# Patient Record
Sex: MALE | Race: WHITE | Employment: FULL TIME | URBAN - METROPOLITAN AREA
[De-identification: names, ages, dates, MRNs, and addresses within clinical notes are randomized per-mention and may not be internally consistent; named-entity substitution may affect disease eponyms.]

---

## 2019-12-02 ENCOUNTER — OFFICE VISIT (OUTPATIENT)
Dept: INTERNAL MEDICINE | Facility: CLINIC | Age: 44
End: 2019-12-02
Payer: COMMERCIAL

## 2019-12-02 VITALS
HEIGHT: 71 IN | TEMPERATURE: 98.2 F | WEIGHT: 315 LBS | DIASTOLIC BLOOD PRESSURE: 102 MMHG | RESPIRATION RATE: 20 BRPM | HEART RATE: 86 BPM | BODY MASS INDEX: 44.1 KG/M2 | OXYGEN SATURATION: 100 % | SYSTOLIC BLOOD PRESSURE: 150 MMHG

## 2019-12-02 DIAGNOSIS — I10 ESSENTIAL HYPERTENSION: Primary | ICD-10-CM

## 2019-12-02 DIAGNOSIS — E66.01 MORBID OBESITY (H): ICD-10-CM

## 2019-12-02 DIAGNOSIS — F17.200 TOBACCO USE DISORDER: ICD-10-CM

## 2019-12-02 PROCEDURE — 80048 BASIC METABOLIC PNL TOTAL CA: CPT | Performed by: INTERNAL MEDICINE

## 2019-12-02 PROCEDURE — 99204 OFFICE O/P NEW MOD 45 MIN: CPT | Performed by: INTERNAL MEDICINE

## 2019-12-02 PROCEDURE — 36415 COLL VENOUS BLD VENIPUNCTURE: CPT | Performed by: INTERNAL MEDICINE

## 2019-12-02 RX ORDER — AMLODIPINE BESYLATE 5 MG/1
5 TABLET ORAL DAILY
Qty: 30 TABLET | Refills: 0 | Status: SHIPPED | OUTPATIENT
Start: 2019-12-02 | End: 2019-12-09 | Stop reason: DRUGHIGH

## 2019-12-02 ASSESSMENT — MIFFLIN-ST. JEOR: SCORE: 2364.31

## 2019-12-02 NOTE — NURSING NOTE
"BP (!) 150/102   Pulse 86   Temp 98.2  F (36.8  C) (Oral)   Resp 20   Wt 146.5 kg (323 lb)   SpO2 100%   Patient in for consultation on elevated BP.  Ana Paula Jha CMA    Conflicting patient information:     When verifying patients allergies, medication, diagnosis and encounters, patient stated they were not accurate. I sent him to the  so they could see if this was the patients correct chart. They stated his current phone number and past addresses helped verify this was the right chart. Patient did state he had not been seen \"by any doctors for 20 years!\". I looked into [facility] to verify immunizations, past address and mothers maiden name, patient verified that all the information was NOT accurate. Kaylah Mccollum was notified and an incident report was initiated.  Ana Paula Jha CMA    "

## 2019-12-02 NOTE — PROGRESS NOTES
"Subjective     Noam Perez is a 44 year old male who presents to clinic today for the following health issues:    HPI      Pt is a 44 year old male who is seen here to day to follow up on elevated BP . Pt was at his dentist office on 11/12/19 for teeth extraction and his BP was high- 155/113, 159/108 and 161/111.   No h/o HTN in the past, has never seen any provider in the last 20 yrs per pt..  Has family history of hypertension in his father and her brother.. Patient does smoke 1 pack a day since age 21.        Patient Active Problem List   Diagnosis     Morbid obesity (H)     History reviewed. No pertinent surgical history.    Social History     Tobacco Use     Smoking status: Current Every Day Smoker     Packs/day: 1.00     Smokeless tobacco: Never Used     Tobacco comment: startred a tthe age of 21   Substance Use Topics     Alcohol use: Yes     Comment: sociallu     Family History   Problem Relation Age of Onset     Heart Disease Father      Hypertension Father      Hypertension Brother          Current Outpatient Medications   Medication Sig Dispense Refill     amLODIPine (NORVASC) 5 MG tablet Take 1 tablet (5 mg) by mouth daily 30 tablet 0         Reviewed and updated as needed this visit by Provider         Review of Systems   ROS COMP: CONSTITUTIONAL: NEGATIVE for fever, chills, change in weight  EYES: NEGATIVE for vision changes or irritation  ENT/MOUTH: NEGATIVE for ear, mouth and throat problems  RESP: NEGATIVE for significant cough or SOB  CV: NEGATIVE for chest pain, palpitations or peripheral edema  MUSCULOSKELETAL: NEGATIVE for significant arthralgias or myalgia  NEURO: NEGATIVE for weakness, dizziness or paresthesias  Psych;negative  Heme;negative  Endo;negative          Objective    BP (!) 150/102   Pulse 86   Temp 98.2  F (36.8  C) (Oral)   Resp 20   Ht 1.791 m (5' 10.5\")   Wt 146.5 kg (323 lb)   SpO2 100%   BMI 45.69 kg/m    There is no height or weight on file to calculate " "BMI.  Physical Exam   GENERAL: healthy, alert and no distress  EYES: Eyes grossly normal to inspection, PERRL and conjunctivae and sclerae normal  NECK: no adenopathy, no asymmetry, masses, or scars and thyroid normal to palpation  RESP: lungs clear to auscultation - no rales, rhonchi or wheezes  CV: regular rate and rhythm,    MS: no gross musculoskeletal defects noted, no edema, no calf tenderness  NEURO: Normal strength and tone, mentation intact and speech normal           Assessment & Plan     (I10) Essential hypertension  (primary encounter diagnosis)  Comment: elevated BP   Plan: Discussed sodium restriction, maintaining ideal body weight and regular exercise program as physiologic means to achieve blood pressure control. The patient will strive towards this. Meanwhile, it is appropriate to lower BP with medications, while observing for therapeutic effect and if appropriate later, can discontinue medications if physiologic methods appear to be effective. The patient indicates understanding of these issues and agrees with the plan.   Started on amLODIPine (NORVASC) 5 MG tablet daily as directed.explained clearly about the medication,insructions and side effects. Check Basic metabolic panel. Pt was advised to f/u in 1 week.             (E66.01) Morbid obesity (H)  Plan:  Discussed in detail about Diet,calorie intake,and importance of regular exercise.      (F17.200) Tobacco use disorder  Plan: discussed smoking cessation , medication options, pt not interested at this time .       Tobacco Cessation:   reports that he has been smoking. He has been smoking about 1.00 pack per day. He has never used smokeless tobacco.  Tobacco Cessation Action Plan: Information offered: Patient not interested at this time      BMI:   Estimated body mass index is 45.69 kg/m  as calculated from the following:    Height as of this encounter: 1.791 m (5' 10.5\").    Weight as of this encounter: 146.5 kg (323 lb).   Weight management " plan: Discussed healthy diet and exercise guidelines      FUTURE APPOINTMENTS:       - Follow-up visit in 1 week    Pt says he has been never seen by any provider for 20 yrs, but  pts chart review has several encounters with providers and pts says that is not his information,. Clinic administrator Kaylah Mccollum  was contacted by MA and report sent to [facility] to correct this.     Silvia Rendon MD  [facility]

## 2019-12-03 LAB
ANION GAP SERPL CALCULATED.3IONS-SCNC: 8 MMOL/L (ref 3–14)
BUN SERPL-MCNC: 13 MG/DL (ref 7–30)
CALCIUM SERPL-MCNC: 8.6 MG/DL (ref 8.5–10.1)
CHLORIDE SERPL-SCNC: 105 MMOL/L (ref 94–109)
CO2 SERPL-SCNC: 24 MMOL/L (ref 20–32)
CREAT SERPL-MCNC: 1.12 MG/DL (ref 0.66–1.25)
GFR SERPL CREATININE-BSD FRML MDRD: 78 ML/MIN/{1.73_M2}
GLUCOSE SERPL-MCNC: 84 MG/DL (ref 70–99)
POTASSIUM SERPL-SCNC: 3.7 MMOL/L (ref 3.4–5.3)
SODIUM SERPL-SCNC: 137 MMOL/L (ref 133–144)

## 2019-12-09 ENCOUNTER — OFFICE VISIT (OUTPATIENT)
Dept: INTERNAL MEDICINE | Facility: CLINIC | Age: 44
End: 2019-12-09
Payer: COMMERCIAL

## 2019-12-09 VITALS
BODY MASS INDEX: 44.1 KG/M2 | TEMPERATURE: 97.9 F | WEIGHT: 315 LBS | HEART RATE: 89 BPM | RESPIRATION RATE: 22 BRPM | SYSTOLIC BLOOD PRESSURE: 142 MMHG | DIASTOLIC BLOOD PRESSURE: 100 MMHG | HEIGHT: 71 IN | OXYGEN SATURATION: 95 %

## 2019-12-09 DIAGNOSIS — I10 ESSENTIAL HYPERTENSION: Primary | ICD-10-CM

## 2019-12-09 DIAGNOSIS — F17.200 TOBACCO USE DISORDER: ICD-10-CM

## 2019-12-09 PROCEDURE — 99214 OFFICE O/P EST MOD 30 MIN: CPT | Performed by: INTERNAL MEDICINE

## 2019-12-09 RX ORDER — AMLODIPINE BESYLATE 10 MG/1
10 TABLET ORAL DAILY
Qty: 30 TABLET | Refills: 0 | Status: SHIPPED | OUTPATIENT
Start: 2019-12-09 | End: 2020-01-03

## 2019-12-09 ASSESSMENT — MIFFLIN-ST. JEOR: SCORE: 2353.42

## 2019-12-09 NOTE — PROGRESS NOTES
"Subjective     Michael Duane Anderson is a 44 year old male who presents to clinic today for the following health issues:    HPI     Hypertension Follow-up      Do you check your blood pressure regularly outside of the clinic? No . started on Norvasc 5 mg at last OV, tolerating well.     Are you following a low salt diet? Yes    Are your blood pressures ever more than 140 on the top number (systolic) OR more   than 90 on the bottom number (diastolic), for example 140/90? Yes    Tobacco use disorder; smoking 1 pack a day since age 21  and not interested in quitting smoking at this time.      Patient Active Problem List   Diagnosis     Morbid obesity (H)     Essential hypertension     Tobacco use disorder     History reviewed. No pertinent surgical history.    Social History     Tobacco Use     Smoking status: Current Every Day Smoker     Packs/day: 1.00     Smokeless tobacco: Never Used     Tobacco comment: startred a tthe age of 21   Substance Use Topics     Alcohol use: Yes     Comment: sociallu     Family History   Problem Relation Age of Onset     Heart Disease Father      Hypertension Father      Hypertension Brother          Current Outpatient Medications   Medication Sig Dispense Refill     amLODIPine (NORVASC) 5 MG tablet Take 1 tablet (5 mg) by mouth daily 30 tablet 0       Reviewed and updated as needed this visit by Provider         Review of Systems   ROS COMP: CONSTITUTIONAL: NEGATIVE for fever, chills, change in weight  RESP: NEGATIVE for significant cough or SOB  CV: NEGATIVE for chest pain, palpitations or peripheral edema  MUSCULOSKELETAL: NEGATIVE for significant arthralgias or myalgia  NEURO: NEGATIVE for weakness, dizziness or paresthesias      Objective    BP (!) 142/100   Pulse 89   Temp 97.9  F (36.6  C) (Oral)   Resp 22   Ht 1.791 m (5' 10.5\")   Wt 145.4 kg (320 lb 9.6 oz)   SpO2 95%   BMI 45.35 kg/m    Body mass index is 45.35 kg/m .  Physical Exam   GENERAL: healthy, alert and no " "distress  RESP: lungs clear to auscultation - no rales, rhonchi or wheezes  CV: regular rate and rhythm,    MS: no gross musculoskeletal defects noted, no edema  NEURO: Normal strength and tone, mentation intact and speech normal        Assessment & Plan     (I10) Essential hypertension  (primary encounter diagnosis)  Plan: Blood pressure still high, increase amlodipine to 10 mg 1 tablet once daily, continue to follow low-sodium diet regular exercise and follow-up in 3 wks       (F17.200) Tobacco use disorder  Plan: Discussed smoking cessation, patient is not interested at this time     Tobacco Cessation:   reports that he has been smoking. He has been smoking about 1.00 pack per day. He has never used smokeless tobacco.  Tobacco Cessation Action Plan: Information offered: Patient not interested at this time      BMI:   Estimated body mass index is 45.35 kg/m  as calculated from the following:    Height as of this encounter: 1.791 m (5' 10.5\").    Weight as of this encounter: 145.4 kg (320 lb 9.6 oz).   Weight management plan: Discussed healthy diet and exercise guidelines        FUTURE APPOINTMENTS:       - Follow-up visit in 3 weeks        Silvia Rendon MD  [facility]        "

## 2019-12-09 NOTE — NURSING NOTE
"BP (!) 140/98   Pulse 89   Temp 97.9  F (36.6  C) (Oral)   Resp 22   Ht 1.791 m (5' 10.5\")   Wt 145.4 kg (320 lb 9.6 oz)   SpO2 95%   BMI 45.35 kg/m    Patient in for med recheck.  Ana Paula Jha, DIANA    "

## 2020-01-03 ENCOUNTER — OFFICE VISIT (OUTPATIENT)
Dept: INTERNAL MEDICINE | Facility: CLINIC | Age: 45
End: 2020-01-03
Payer: COMMERCIAL

## 2020-01-03 VITALS
SYSTOLIC BLOOD PRESSURE: 138 MMHG | DIASTOLIC BLOOD PRESSURE: 88 MMHG | OXYGEN SATURATION: 96 % | WEIGHT: 315 LBS | RESPIRATION RATE: 19 BRPM | HEIGHT: 71 IN | TEMPERATURE: 98.3 F | BODY MASS INDEX: 44.1 KG/M2 | HEART RATE: 94 BPM

## 2020-01-03 DIAGNOSIS — I10 ESSENTIAL HYPERTENSION: ICD-10-CM

## 2020-01-03 PROCEDURE — 99213 OFFICE O/P EST LOW 20 MIN: CPT | Performed by: INTERNAL MEDICINE

## 2020-01-03 RX ORDER — AMLODIPINE BESYLATE 10 MG/1
10 TABLET ORAL DAILY
Qty: 90 TABLET | Refills: 1 | Status: SHIPPED | OUTPATIENT
Start: 2020-01-03 | End: 2020-09-30

## 2020-01-03 ASSESSMENT — MIFFLIN-ST. JEOR: SCORE: 2372.02

## 2020-01-03 NOTE — NURSING NOTE
"BP (!) 138/96   Pulse 94   Temp 98.3  F (36.8  C) (Oral)   Resp 19   Ht 1.791 m (5' 10.5\")   Wt 147.3 kg (324 lb 11.2 oz)   SpO2 96%   BMI 45.93 kg/m    Patient in for BP med recheck.  Ana Paula Jha, DIANA    "

## 2020-01-03 NOTE — PROGRESS NOTES
"Subjective     Michael Duane Anderson is a 44 year old male who presents to clinic today for the following health issues:    HPI   Hypertension Follow-up      Do you check your blood pressure regularly outside of the clinic? No     Are you following a low salt diet? Yes    Are your blood pressures ever more than 140 on the top number (systolic) OR more   than 90 on the bottom number (diastolic), for example 140/90? Yes       Patient Active Problem List   Diagnosis     Morbid obesity (H)     Essential hypertension     Tobacco use disorder     History reviewed. No pertinent surgical history.    Social History     Tobacco Use     Smoking status: Current Every Day Smoker     Packs/day: 1.00     Smokeless tobacco: Never Used     Tobacco comment: startred a tthe age of 21   Substance Use Topics     Alcohol use: Yes     Comment: sociallu     Family History   Problem Relation Age of Onset     Heart Disease Father      Hypertension Father      Hypertension Brother          Current Outpatient Medications   Medication Sig Dispense Refill     amLODIPine (NORVASC) 10 MG tablet Take 1 tablet (10 mg) by mouth daily 90 tablet 1         Reviewed and updated as needed this visit by Provider         Review of Systems   ROS COMP: CONSTITUTIONAL: NEGATIVE for fever, chills, change in weight  RESP: NEGATIVE for significant cough or SOB  CV: NEGATIVE for chest pain, palpitations or peripheral edema  MUSCULOSKELETAL: NEGATIVE for significant arthralgias or myalgia  NEURO: NEGATIVE for weakness, dizziness or paresthesias        Objective    /88   Pulse 94   Temp 98.3  F (36.8  C) (Oral)   Resp 19   Ht 1.791 m (5' 10.5\")   Wt 147.3 kg (324 lb 11.2 oz)   SpO2 96%   BMI 45.93 kg/m    Body mass index is 45.93 kg/m .  Physical Exam   GENERAL: healthy, alert and no distress  EYES: Eyes grossly normal to inspection, PERRL and conjunctivae and sclerae normal  NECK: no adenopathy, no asymmetry, masses, or scars and thyroid normal to " palpation  RESP: lungs clear to auscultation - no rales, rhonchi or wheezes  CV: regular rate and rhythm,  MS: no gross musculoskeletal defects noted, no edema  NEURO: Normal strength and tone, mentation intact and speech normal           Assessment & Plan     (I10) Essential hypertension  Comment: BP has improved,   Plan: refilled  amLODIPine (NORVASC) 10 MG tablet as directed.explained clearly about the medication,insructions and side effects. Note given for pts dentist about pts BP. .Advised to follow low salt diet and exercise and f/u in 6 mths.  Again counseled on smoking cessation              FUTURE APPOINTMENTS:       - Follow-up visit in 3 months         Silvia Rendon MD  [facility]

## 2020-09-30 DIAGNOSIS — I10 ESSENTIAL HYPERTENSION: ICD-10-CM

## 2020-09-30 RX ORDER — AMLODIPINE BESYLATE 10 MG/1
TABLET ORAL
Qty: 90 TABLET | Refills: 0 | Status: SHIPPED | OUTPATIENT
Start: 2020-09-30 | End: 2021-01-29

## 2020-09-30 NOTE — LETTER
[facility]  [address], [address]  [place], [place]  [address]                                            TEL:[phone]  FAX:[phone]      Michael Duane Anderson  [address]  [place] [address]      September 30, 2020    Dear Noam      We have received a refill request from your pharmacy for your medication - Amlodipine. Many medications require routine follow-up with your provider and a review of your chart indicates that you are due for blood pressure appointment and labs before next refill. We have sent a one time, 90 day refill to your pharmacy to allow you time to schedule an appointment.     Please call [phone] to schedule an appointment.  We look forward to seeing you in the near future.      Thank you,     [facility]

## 2020-09-30 NOTE — TELEPHONE ENCOUNTER
Medication is being filled for 1 time refill only due to:  Patient needs to be seen because due for BP and labs.     Letter mailed to patient to schedule an appointment.

## 2021-01-29 DIAGNOSIS — I10 ESSENTIAL HYPERTENSION: ICD-10-CM

## 2021-01-29 RX ORDER — AMLODIPINE BESYLATE 10 MG/1
TABLET ORAL
Qty: 30 TABLET | Refills: 0 | Status: SHIPPED | OUTPATIENT
Start: 2021-01-29 | End: 2021-03-09

## 2021-01-29 NOTE — LETTER
[facility]  [address], [address]  [place], [place]  [address]                                            TEL:[phone]  FAX:[phone]      Michael Duane Anderson  [address]  [place] [address]      February 2, 2021    Dear Noam,    We are concerned about your health care.  We recently provided you with a medication refill.  Many medications require routine follow-up with your provider.      At this time we ask that: You schedule a routine office visit with your physician to follow your medications and labs.    Your prescription: Has been refilled for 1 month so you may have time for the above noted follow-up.      Thank you,      Jeyson Rendon M.D.

## 2021-01-29 NOTE — TELEPHONE ENCOUNTER
"Requested Prescriptions   Pending Prescriptions Disp Refills     amLODIPine (NORVASC) 10 MG tablet [Pharmacy Med Name: amLODIPine Besylate 10 MG Oral Tablet] 90 tablet 0     Sig: Take 1 tablet by mouth once daily       Calcium Channel Blockers Protocol  Failed - 1/29/2021  3:43 PM        Failed - Blood pressure under 140/90 in past 12 months     BP Readings from Last 3 Encounters:   01/03/20 138/88   12/09/19 (!) 142/100   12/02/19 (!) 150/102                 Failed - Recent (12 mo) or future (30 days) visit within the authorizing provider's specialty     Patient has had an office visit with the authorizing provider or a provider within the authorizing providers department within the previous 12 mos or has a future within next 30 days. See \"Patient Info\" tab in inbasket, or \"Choose Columns\" in Meds & Orders section of the refill encounter.              Failed - Normal serum creatinine on file in past 12 months     Recent Labs   Lab Test 12/02/19  1657   CR 1.12       Ok to refill medication if creatinine is low          Passed - Medication is active on med list        Passed - Patient is age 18 or older             "

## 2021-01-29 NOTE — TELEPHONE ENCOUNTER
Routing refill request to provider for review/approval because:  Mylene given x1 and patient did not follow up, please advise

## 2021-03-09 ENCOUNTER — OFFICE VISIT (OUTPATIENT)
Dept: INTERNAL MEDICINE | Facility: CLINIC | Age: 46
End: 2021-03-09
Payer: COMMERCIAL

## 2021-03-09 VITALS
WEIGHT: 315 LBS | HEIGHT: 71 IN | SYSTOLIC BLOOD PRESSURE: 148 MMHG | BODY MASS INDEX: 44.1 KG/M2 | DIASTOLIC BLOOD PRESSURE: 98 MMHG | RESPIRATION RATE: 17 BRPM | OXYGEN SATURATION: 99 % | HEART RATE: 83 BPM | TEMPERATURE: 98.1 F

## 2021-03-09 DIAGNOSIS — F17.200 TOBACCO USE DISORDER: ICD-10-CM

## 2021-03-09 DIAGNOSIS — I10 ESSENTIAL HYPERTENSION: Primary | ICD-10-CM

## 2021-03-09 DIAGNOSIS — E66.01 MORBID OBESITY (H): ICD-10-CM

## 2021-03-09 PROCEDURE — 36415 COLL VENOUS BLD VENIPUNCTURE: CPT | Performed by: INTERNAL MEDICINE

## 2021-03-09 PROCEDURE — 80048 BASIC METABOLIC PNL TOTAL CA: CPT | Performed by: INTERNAL MEDICINE

## 2021-03-09 PROCEDURE — 99214 OFFICE O/P EST MOD 30 MIN: CPT | Performed by: INTERNAL MEDICINE

## 2021-03-09 RX ORDER — LISINOPRIL 10 MG/1
10 TABLET ORAL DAILY
Qty: 31 TABLET | Refills: 0 | Status: SHIPPED | OUTPATIENT
Start: 2021-03-09 | End: 2021-06-28

## 2021-03-09 RX ORDER — AMLODIPINE BESYLATE 10 MG/1
10 TABLET ORAL DAILY
Qty: 31 TABLET | Refills: 0 | Status: SHIPPED | OUTPATIENT
Start: 2021-03-09 | End: 2021-05-26

## 2021-03-09 ASSESSMENT — MIFFLIN-ST. JEOR: SCORE: 2370.64

## 2021-03-09 NOTE — NURSING NOTE
"BP (!) 148/98   Pulse 83   Temp 98.1  F (36.7  C) (Oral)   Resp 17   Ht 1.791 m (5' 10.5\")   Wt 148.1 kg (326 lb 9.6 oz)   SpO2 99%   BMI 46.20 kg/m    Patient in for follow up HTN.  Ana Paula Jha, DIANA    "

## 2021-03-09 NOTE — PROGRESS NOTES
"    Assessment & Plan     (I10) Essential hypertension  (primary encounter diagnosis)  Comment: BP elevated   Plan: started on   lisinopril (ZESTRIL) 10 MG tablet once daily as directed.explained clearly about the medication,insructions and side effects. Continue and refilled   , amLODIPine (NORVASC) 10 MG tablet.explained clearly about the medication,insructions and side effects.  Continue to follow low-sodium diet regular exercise and follow-up in clinic in 1 month, will check  basic metabolic panel,  Patient was counseled on smoking cessation.    (E66.01) Morbid obesity (H)  Plan: Discussed in detail about Diet,calorie intake,and importance of regular exercise,       (F17.200) Tobacco use disorder  Plan: smokes 1ppd for 24 yrs, advised/counscelled on smoking cessation     Review of the result(s) of each unique test - BMP     Tobacco Cessation:   reports that he has been smoking. He has a 24.00 pack-year smoking history. He has never used smokeless tobacco.  Tobacco Cessation Action Plan: Information offered: Patient not interested at this time    BMI:   Estimated body mass index is 46.2 kg/m  as calculated from the following:    Height as of this encounter: 1.791 m (5' 10.5\").    Weight as of this encounter: 148.1 kg (326 lb 9.6 oz).   Weight management plan: Discussed healthy diet and exercise guidelines      Return in about 4 weeks (around 4/6/2021) for BP Recheck.    Silvia Rendon MD  [facility] JUHI Santacruz is a 46 year old who presents for the following health issues    HPI       Hypertension Follow-up      Do you check your blood pressure regularly outside of the clinic? No , currently on amlodipine 10 mg daily tolerating well    Are you following a low salt diet? Yes    Are your blood pressures ever more than 140 on the top number (systolic) OR more   than 90 on the bottom number (diastolic), for example 140/90? Yes    History reviewed. No pertinent past " "medical history.     Patient Active Problem List   Diagnosis     Morbid obesity (H)     Essential hypertension     Tobacco use disorder         Current Outpatient Medications   Medication Sig Dispense Refill     amLODIPine (NORVASC) 10 MG tablet Take 1 tablet (10 mg) by mouth daily 31 tablet 0     lisinopril (ZESTRIL) 10 MG tablet Take 1 tablet (10 mg) by mouth daily 31 tablet 0         Review of Systems   CONSTITUTIONAL: NEGATIVE for fever, chills, change in weight  RESP: NEGATIVE for significant cough or SOB  CV: NEGATIVE for chest pain, palpitations or peripheral edema  MUSCULOSKELETAL: NEGATIVE for significant arthralgias or myalgia      Objective    BP (!) 148/98   Pulse 83   Temp 98.1  F (36.7  C) (Oral)   Resp 17   Ht 1.791 m (5' 10.5\")   Wt 148.1 kg (326 lb 9.6 oz)   SpO2 99%   BMI 46.20 kg/m    Body mass index is 46.2 kg/m .  Repeat blood pressure 150/100  Physical Exam   GENERAL: healthy, alert and no distress  RESP: lungs clear to auscultation - no rales, rhonchi or wheezes  CV: regular rate and rhythm,    MS: no gross musculoskeletal defects noted, no edema           "

## 2021-03-11 LAB
ANION GAP SERPL CALCULATED.3IONS-SCNC: 2 MMOL/L (ref 3–14)
BUN SERPL-MCNC: 12 MG/DL (ref 7–30)
CALCIUM SERPL-MCNC: 8.9 MG/DL (ref 8.5–10.1)
CHLORIDE SERPL-SCNC: 107 MMOL/L (ref 94–109)
CO2 SERPL-SCNC: 27 MMOL/L (ref 20–32)
CREAT SERPL-MCNC: 1.15 MG/DL (ref 0.66–1.25)
GFR SERPL CREATININE-BSD FRML MDRD: 75 ML/MIN/{1.73_M2}
GLUCOSE SERPL-MCNC: 80 MG/DL (ref 70–99)
POTASSIUM SERPL-SCNC: 4.6 MMOL/L (ref 3.4–5.3)
SODIUM SERPL-SCNC: 136 MMOL/L (ref 133–144)

## 2021-04-03 ENCOUNTER — HEALTH MAINTENANCE LETTER (OUTPATIENT)
Age: 46
End: 2021-04-03

## 2021-05-13 DIAGNOSIS — I10 ESSENTIAL HYPERTENSION: ICD-10-CM

## 2021-05-14 NOTE — TELEPHONE ENCOUNTER
Patient's blood pressure medication was adjusted at last office visit and he was advised to follow-up in 1 month no appointment made.    Please advise patient that I can see him today at 3:40 PM in clinic on my virtual spot

## 2021-05-14 NOTE — TELEPHONE ENCOUNTER
Pending Prescriptions:                       Disp   Refills    amLODIPine (NORVASC) 10 MG tablet [Pharmac*31 tab*0        Sig: Take 1 tablet by mouth once daily    Routing refill request to provider for review/approval because:  BP Readings from Last 3 Encounters:   03/09/21 (!) 148/98   01/03/20 138/88   12/09/19 (!) 142/100

## 2021-05-26 RX ORDER — AMLODIPINE BESYLATE 10 MG/1
TABLET ORAL
Qty: 15 TABLET | Refills: 0 | Status: SHIPPED | OUTPATIENT
Start: 2021-05-26 | End: 2021-05-27

## 2021-05-26 NOTE — TELEPHONE ENCOUNTER
Attempted to contact pt. Left message to call clinic.     Pt has not called back yet.   He was no show 1 x, and cancelled once in April.     Please deny or approve medication.

## 2021-05-27 RX ORDER — AMLODIPINE BESYLATE 10 MG/1
10 TABLET ORAL DAILY
Qty: 15 TABLET | Refills: 0 | Status: SHIPPED | OUTPATIENT
Start: 2021-05-27 | End: 2021-06-28

## 2021-05-27 NOTE — TELEPHONE ENCOUNTER
Left message on voice mail -that Patient   needs and appiontment and medication was refilled for two weeks only       SYEDA Whitehead LPN

## 2021-05-27 NOTE — TELEPHONE ENCOUNTER
Prescription did not transmit to pharmacy. Call to pharmacy and confirmed this. Prescription resent.

## 2021-05-27 NOTE — TELEPHONE ENCOUNTER
I have refilled for 2 weeks only, please advise appointment to see me in clinic, can schedule at any of my virtual spots

## 2021-06-28 ENCOUNTER — OFFICE VISIT (OUTPATIENT)
Dept: INTERNAL MEDICINE | Facility: CLINIC | Age: 46
End: 2021-06-28
Payer: COMMERCIAL

## 2021-06-28 VITALS
OXYGEN SATURATION: 98 % | BODY MASS INDEX: 44.1 KG/M2 | HEIGHT: 71 IN | HEART RATE: 89 BPM | WEIGHT: 315 LBS | TEMPERATURE: 98.2 F | SYSTOLIC BLOOD PRESSURE: 162 MMHG | DIASTOLIC BLOOD PRESSURE: 110 MMHG | RESPIRATION RATE: 18 BRPM

## 2021-06-28 DIAGNOSIS — I10 ESSENTIAL HYPERTENSION: ICD-10-CM

## 2021-06-28 PROCEDURE — 99214 OFFICE O/P EST MOD 30 MIN: CPT | Performed by: INTERNAL MEDICINE

## 2021-06-28 RX ORDER — LISINOPRIL 10 MG/1
10 TABLET ORAL DAILY
Qty: 31 TABLET | Refills: 1 | Status: SHIPPED | OUTPATIENT
Start: 2021-06-28

## 2021-06-28 RX ORDER — AMLODIPINE BESYLATE 10 MG/1
10 TABLET ORAL DAILY
Qty: 31 TABLET | Refills: 1 | Status: SHIPPED | OUTPATIENT
Start: 2021-06-28

## 2021-06-28 ASSESSMENT — MIFFLIN-ST. JEOR: SCORE: 2392.86

## 2021-06-28 NOTE — PROGRESS NOTES
Assessment & Plan     (I10) Essential hypertension  Plan: Elevated blood pressure , restarted on amLODIPine (NORVASC) 10 MG tablet,and  lisinopril (ZESTRIL) 10 MG tablet as directed.explained clearly about the medication,insructions and side effects. Discussed sodium restriction, maintaining ideal body weight and regular exercise program as physiologic means to achieve blood pressure control. The patient will strive towards this.   Continue home readings and return in 2 wks for nurse only BP appt.            Prescription drug management       Return in about 2 weeks (around 7/12/2021) for BP Recheck- nurse only appt .    Silvia Rendon MD  [facility] JUHI Santacruz is a 46 year old who presents for the following health issues   Patient is being seen for an blood pressure follow up.  HPI     Hypertension Follow-up      Do you check your blood pressure regularly outside of the clinic? No , patient was started on lisinopril 10 mg at last office visit, also lost on amlodipine 10 mg but patient has been out of his blood pressure medications for 1 week    Are you following a low salt diet? No    Are your blood pressures ever more than 140 on the top number (systolic) OR more   than 90 on the bottom number (diastolic), for example 140/90? Yes      How many servings of fruits and vegetables do you eat daily?  0-1    On average, how many sweetened beverages do you drink each day (Examples: soda, juice, sweet tea, etc.  Do NOT count diet or artificially sweetened beverages)?   1    How many days per week do you exercise enough to make your heart beat faster? 7    How many minutes a day do you exercise enough to make your heart beat faster? 20 - 29    How many days per week do you miss taking your medication? 0      Patient Active Problem List   Diagnosis     Morbid obesity (H)     Essential hypertension     Tobacco use disorder         Current Outpatient Medications  "  Medication Sig Dispense Refill     amLODIPine (NORVASC) 10 MG tablet Take 1 tablet (10 mg) by mouth daily 31 tablet 1     lisinopril (ZESTRIL) 10 MG tablet Take 1 tablet (10 mg) by mouth daily 31 tablet 1         Review of Systems   CONSTITUTIONAL: NEGATIVE for fever, chills, change in weight  RESP: NEGATIVE for significant cough or SOB  CV: NEGATIVE for chest pain, palpitations or peripheral edema  MUSCULOSKELETAL: NEGATIVE for significant arthralgias or myalgia  NEURO: NEGATIVE for weakness, dizziness or paresthesias  PSYCHIATRIC: NEGATIVE for changes in mood or affect      Objective    BP (!) 162/110   Pulse 89   Temp 98.2  F (36.8  C) (Oral)   Resp 18   Ht 1.791 m (5' 10.5\")   Wt (!) 150.4 kg (331 lb 8 oz)   SpO2 98%   BMI 46.89 kg/m    Body mass index is 46.89 kg/m .  Physical Exam   GENERAL: healthy, alert and no distress  EYES: Eyes grossly normal to inspection, PERRL and conjunctivae and sclerae normal  RESP: lungs clear to auscultation - no rales, rhonchi or wheezes  CV: regular rate and rhythm, normal S1 S2, no S3 or S4, no murmur, click or rub, no peripheral edema and peripheral pulses strong  MS: no gross musculoskeletal defects noted, no edema  NEURO: Normal strength and tone, mentation intact and speech normal  PSYCH: mentation appears normal, affect normal/bright                 "

## 2021-07-12 ENCOUNTER — ALLIED HEALTH/NURSE VISIT (OUTPATIENT)
Dept: NURSING | Facility: CLINIC | Age: 46
End: 2021-07-12
Payer: COMMERCIAL

## 2021-07-12 VITALS — DIASTOLIC BLOOD PRESSURE: 89 MMHG | SYSTOLIC BLOOD PRESSURE: 125 MMHG

## 2021-07-12 DIAGNOSIS — Z01.30 BP CHECK: Primary | ICD-10-CM

## 2021-07-12 PROCEDURE — 99207 PR NO CHARGE NURSE ONLY: CPT

## 2021-09-18 ENCOUNTER — HEALTH MAINTENANCE LETTER (OUTPATIENT)
Age: 46
End: 2021-09-18

## 2022-04-30 ENCOUNTER — HEALTH MAINTENANCE LETTER (OUTPATIENT)
Age: 47
End: 2022-04-30

## 2022-11-20 ENCOUNTER — HEALTH MAINTENANCE LETTER (OUTPATIENT)
Age: 47
End: 2022-11-20

## 2023-06-01 ENCOUNTER — HEALTH MAINTENANCE LETTER (OUTPATIENT)
Age: 48
End: 2023-06-01

## 2024-06-16 ENCOUNTER — HEALTH MAINTENANCE LETTER (OUTPATIENT)
Age: 49
End: 2024-06-16